# Patient Record
Sex: MALE | Race: WHITE
[De-identification: names, ages, dates, MRNs, and addresses within clinical notes are randomized per-mention and may not be internally consistent; named-entity substitution may affect disease eponyms.]

---

## 2017-05-18 ENCOUNTER — HOSPITAL ENCOUNTER (EMERGENCY)
Dept: HOSPITAL 53 - M ED | Age: 52
Discharge: HOME | End: 2017-05-18
Payer: COMMERCIAL

## 2017-05-18 VITALS — SYSTOLIC BLOOD PRESSURE: 153 MMHG | DIASTOLIC BLOOD PRESSURE: 67 MMHG

## 2017-05-18 DIAGNOSIS — Z79.899: ICD-10-CM

## 2017-05-18 DIAGNOSIS — R94.31: ICD-10-CM

## 2017-05-18 DIAGNOSIS — R00.2: Primary | ICD-10-CM

## 2017-05-18 LAB
ANION GAP SERPL CALC-SCNC: 6 MEQ/L (ref 8–16)
BASOPHILS # BLD AUTO: 0 K/MM3 (ref 0–0.2)
BASOPHILS NFR BLD AUTO: 0.4 % (ref 0–1)
BUN SERPL-MCNC: 19 MG/DL (ref 7–18)
CALCIUM SERPL-MCNC: 8.9 MG/DL (ref 8.5–10.1)
CHLORIDE SERPL-SCNC: 104 MEQ/L (ref 98–107)
CO2 SERPL-SCNC: 31 MEQ/L (ref 21–32)
CREAT SERPL-MCNC: 0.93 MG/DL (ref 0.7–1.3)
EOSINOPHIL # BLD AUTO: 0.2 K/MM3 (ref 0–0.5)
EOSINOPHIL NFR BLD AUTO: 1.9 % (ref 0–3)
ERYTHROCYTE [DISTWIDTH] IN BLOOD BY AUTOMATED COUNT: 13.1 % (ref 11.5–14.5)
GFR SERPL CREATININE-BSD FRML MDRD: > 60 ML/MIN/{1.73_M2} (ref 56–?)
GLUCOSE SERPL-MCNC: 125 MG/DL (ref 70–105)
LARGE UNSTAINED CELL #: 0.2 K/MM3 (ref 0–0.4)
LARGE UNSTAINED CELL %: 2.4 % (ref 0–4)
LYMPHOCYTES # BLD AUTO: 2.1 K/MM3 (ref 1.5–4.5)
LYMPHOCYTES NFR BLD AUTO: 23.9 % (ref 24–44)
MCH RBC QN AUTO: 29.2 PG (ref 27–33)
MCHC RBC AUTO-ENTMCNC: 33.8 G/DL (ref 32–36.5)
MCV RBC AUTO: 86.3 FL (ref 80–96)
MONOCYTES # BLD AUTO: 0.6 K/MM3 (ref 0–0.8)
MONOCYTES NFR BLD AUTO: 7.3 % (ref 0–5)
NEUTROPHILS # BLD AUTO: 5.6 K/MM3 (ref 1.8–7.7)
NEUTROPHILS NFR BLD AUTO: 64.1 % (ref 36–66)
PLATELET # BLD AUTO: 318 K/MM3 (ref 150–450)
POTASSIUM SERPL-SCNC: 3.7 MEQ/L (ref 3.5–5.1)
SODIUM SERPL-SCNC: 141 MEQ/L (ref 136–145)
WBC # BLD AUTO: 8.8 K/MM3 (ref 4–10)

## 2017-05-18 NOTE — REP
Clinical:  Chest pain .

 

Comparison: None .

 

Technique:  PA and lateral.

 

Findings:

The mediastinum and cardiac silhouette are normal.  The lung fields are clear and

without acute consolidation, effusion, or pneumothorax.  The skeletal structures

are intact and normal.

 

Impression:

1.   No acute cardiopulmonary process.

 

 

Signed by

[place] MD 05/18/2017 08:28 A

## 2017-05-18 NOTE — ECGEPIP
Stationary ECG Study

                           [facility] - ED

                                       

                                       Test Date:    2017

Pat Name:     EDER HORAN            Department:   

Patient ID:   [id number]                 Room:         -

Gender:       M                        Technician:   edvin

:          1965               Requested By: Humphrey BEAR

Order Number: [id number]     Reading MD:   France Brothers

                                 Measurements

Intervals                              Axis          

Rate:         98                       P:            54

NM:           154                      QRS:          17

QRSD:         98                       T:            13

QT:           342                                    

QTc:          438                                    

                           Interpretive Statements

SINUS RHYTHM

NSTTW ABNORMALITY

SIMILAR 16

Electronically Signed On 2017 8:20:05 EDT by France Brothers

## 2018-01-26 ENCOUNTER — HOSPITAL ENCOUNTER (EMERGENCY)
Dept: HOSPITAL 53 - M ED | Age: 53
Discharge: HOME | End: 2018-01-26
Payer: COMMERCIAL

## 2018-01-26 DIAGNOSIS — I49.3: Primary | ICD-10-CM

## 2018-01-26 DIAGNOSIS — Z87.891: ICD-10-CM

## 2018-01-26 LAB
ANION GAP: 7 MEQ/L (ref 8–16)
BASO #: 0.1 10^3/UL (ref 0–0.2)
BASO %: 0.7 % (ref 0–1)
BLOOD UREA NITROGEN: 17 MG/DL (ref 7–18)
CALCIUM LEVEL: 9.1 MG/DL (ref 8.5–10.1)
CARBON DIOXIDE LEVEL: 31 MEQ/L (ref 21–32)
CHLORIDE LEVEL: 102 MEQ/L (ref 98–107)
CK MB CFR.DF SERPL CALC: 1.57
CK SERPL-CCNC: 210 U/L (ref 39–308)
CK-MB VALUE MASS: 3.3 NG/ML (ref 0–3.6)
CREATININE FOR GFR: 1.02 MG/DL (ref 0.7–1.3)
D-DIMER QUANT: < 270 NG/ML (ref ?–500)
EOS #: 0.1 10^3/UL (ref 0–0.5)
EOSINOPHIL NFR BLD AUTO: 0.6 % (ref 0–3)
GFR SERPL CREATININE-BSD FRML MDRD: > 60 ML/MIN/{1.73_M2} (ref 56–?)
GLUCOSE, FASTING: 100 MG/DL (ref 70–100)
HEMATOCRIT: 47.6 % (ref 42–52)
HEMOGLOBIN: 16.9 G/DL (ref 14–18)
IMMATURE GRANULOCYTE #: 0.2 10^3/UL (ref 0–0)
IMMATURE GRANULOCYTE %: 1.8 % (ref 0–0)
LYMPH #: 3.6 10^3/UL (ref 1.5–4.5)
LYMPH %: 31.2 % (ref 24–44)
MAGNESIUM LEVEL: 2.2 MG/DL (ref 1.8–2.4)
MEAN CORPUSCULAR HEMOGLOBIN: 29 PG (ref 27–33)
MEAN CORPUSCULAR HGB CONC: 35.5 G/DL (ref 32–36.5)
MEAN CORPUSCULAR VOLUME: 81.8 FL (ref 80–96)
MONO #: 1.2 10^3/UL (ref 0–0.8)
MONO %: 10.6 % (ref 0–5)
NEUTROPHILS #: 6.4 10^3/UL (ref 1.8–7.7)
NEUTROPHILS %: 55.1 % (ref 36–66)
NRBC BLD AUTO-RTO: 0 % (ref 0–0)
PLATELET COUNT, AUTOMATED: 400 10^3/UL (ref 150–450)
POTASSIUM SERUM: 3.4 MEQ/L (ref 3.5–5.1)
RED BLOOD COUNT: 5.82 10^6/UL (ref 4.3–6.1)
RED CELL DISTRIBUTION WIDTH: 12.7 % (ref 11.5–14.5)
SODIUM LEVEL: 140 MEQ/L (ref 136–145)
TROPONIN I: < 0.02 NG/ML (ref ?–0.1)
WHITE BLOOD COUNT: 11.7 10^3/UL (ref 4–10)

## 2018-01-26 PROCEDURE — 93005 ELECTROCARDIOGRAM TRACING: CPT

## 2018-02-01 ENCOUNTER — HOSPITAL ENCOUNTER (EMERGENCY)
Dept: HOSPITAL 53 - M ED | Age: 53
Discharge: HOME | End: 2018-02-01
Payer: COMMERCIAL

## 2018-02-01 DIAGNOSIS — I48.91: Primary | ICD-10-CM

## 2018-02-01 DIAGNOSIS — I50.9: ICD-10-CM

## 2018-02-01 DIAGNOSIS — Z87.891: ICD-10-CM

## 2018-02-01 DIAGNOSIS — I11.0: ICD-10-CM

## 2018-02-01 LAB
ANION GAP: 8 MEQ/L (ref 8–16)
BASO #: 0.1 10^3/UL (ref 0–0.2)
BASO %: 0.5 % (ref 0–1)
BLOOD UREA NITROGEN: 20 MG/DL (ref 7–18)
CALCIUM LEVEL: 9.6 MG/DL (ref 8.5–10.1)
CARBON DIOXIDE LEVEL: 29 MEQ/L (ref 21–32)
CHLORIDE LEVEL: 102 MEQ/L (ref 98–107)
CK MB CFR.DF SERPL CALC: 2.32
CK SERPL-CCNC: 125 U/L (ref 39–308)
CK-MB VALUE MASS: 2.9 NG/ML (ref 0–3.6)
CREATININE FOR GFR: 0.97 MG/DL (ref 0.7–1.3)
EOS #: 0.3 10^3/UL (ref 0–0.5)
EOSINOPHIL NFR BLD AUTO: 2.4 % (ref 0–3)
GFR SERPL CREATININE-BSD FRML MDRD: > 60 ML/MIN/{1.73_M2} (ref 56–?)
GLUCOSE, FASTING: 112 MG/DL (ref 70–100)
HEMATOCRIT: 50 % (ref 42–52)
HEMOGLOBIN: 17.6 G/DL (ref 14–18)
IMMATURE GRANULOCYTE #: 0.1 10^3/UL (ref 0–0)
IMMATURE GRANULOCYTE %: 0.8 % (ref 0–0)
INR: 0.86
LYMPH #: 3.6 10^3/UL (ref 1.5–4.5)
LYMPH %: 30.9 % (ref 24–44)
MEAN CORPUSCULAR HEMOGLOBIN: 29.2 PG (ref 27–33)
MEAN CORPUSCULAR HGB CONC: 35.2 G/DL (ref 32–36.5)
MEAN CORPUSCULAR VOLUME: 82.9 FL (ref 80–96)
MONO #: 1.2 10^3/UL (ref 0–0.8)
MONO %: 10.1 % (ref 0–5)
NEUTROPHILS #: 6.5 10^3/UL (ref 1.8–7.7)
NEUTROPHILS %: 55.3 % (ref 36–66)
NRBC BLD AUTO-RTO: 0 % (ref 0–0)
PLATELET COUNT, AUTOMATED: 363 10^3/UL (ref 150–450)
POTASSIUM SERUM: 3.2 MEQ/L (ref 3.5–5.1)
PROTHROMBIN TIME: 11.8 SECONDS (ref 12.4–14.5)
RED BLOOD COUNT: 6.03 10^6/UL (ref 4.3–6.1)
RED CELL DISTRIBUTION WIDTH: 12.7 % (ref 11.5–14.5)
SODIUM LEVEL: 139 MEQ/L (ref 136–145)
THYROID STIMULATING HORMONE: 1.84 UIU/ML (ref 0.36–3.74)
TROPONIN I: < 0.02 NG/ML (ref ?–0.1)
WHITE BLOOD COUNT: 11.7 10^3/UL (ref 4–10)

## 2018-02-01 RX ADMIN — LORAZEPAM 1 MG: 2 INJECTION INTRAMUSCULAR; INTRAVENOUS at 21:05

## 2018-02-01 RX ADMIN — SODIUM CHLORIDE 1 MLS/HR: 9 INJECTION, SOLUTION INTRAVENOUS at 20:30

## 2018-02-01 RX ADMIN — METOPROLOL TARTRATE 1 MG: 50 TABLET, FILM COATED ORAL at 20:38

## 2018-02-01 RX ADMIN — METOPROLOL TARTRATE 1 MG: 5 INJECTION INTRAVENOUS at 21:01

## 2018-02-01 RX ADMIN — ASPIRIN 81 MG CHEWABLE TABLET 1 MG: 81 TABLET CHEWABLE at 20:38

## 2019-04-30 ENCOUNTER — HOSPITAL ENCOUNTER (EMERGENCY)
Dept: HOSPITAL 53 - M ED | Age: 54
Discharge: HOME | End: 2019-04-30
Payer: COMMERCIAL

## 2019-04-30 VITALS — DIASTOLIC BLOOD PRESSURE: 83 MMHG | SYSTOLIC BLOOD PRESSURE: 144 MMHG

## 2019-04-30 VITALS — DIASTOLIC BLOOD PRESSURE: 86 MMHG | SYSTOLIC BLOOD PRESSURE: 150 MMHG

## 2019-04-30 VITALS — WEIGHT: 245.51 LBS | HEIGHT: 72 IN | BODY MASS INDEX: 33.25 KG/M2

## 2019-04-30 DIAGNOSIS — I48.91: Primary | ICD-10-CM

## 2019-04-30 DIAGNOSIS — Z79.899: ICD-10-CM

## 2019-04-30 DIAGNOSIS — I10: ICD-10-CM

## 2019-04-30 NOTE — ECGEPIP
Stationary ECG Study

                           [facility] - ED

                                       

                                       Test Date:    2019

Pat Name:     EDER HORAN            Department:   

Patient ID:   [id number]                 Room:         -

Gender:       M                        Technician:   gt

:          1965               Requested By: MARIA ELENA MARSHALL 

Order Number: [id number]     Reading MD:   Humphrey Franco

                                 Measurements

Intervals                              Axis          

Rate:         98                       P:            44

AK:           146                      QRS:          13

QRSD:         86                       T:            16

QT:           336                                    

QTc:          429                                    

                           Interpretive Statements

SINUS RHYTHM

NSTTW ABNORMALITIES

RHYTHM/RATE CHANGE COMPARED TO 18

 

Electronically Signed On 2019 7:17:07 EDT by Humphrey Franco

## 2019-07-19 ENCOUNTER — HOSPITAL ENCOUNTER (INPATIENT)
Dept: HOSPITAL 53 - M ED | Age: 54
Discharge: HOME | DRG: 201 | End: 2019-07-19
Attending: INTERNAL MEDICINE | Admitting: INTERNAL MEDICINE
Payer: COMMERCIAL

## 2019-07-19 VITALS — BODY MASS INDEX: 33.93 KG/M2 | WEIGHT: 250.53 LBS | HEIGHT: 72 IN

## 2019-07-19 VITALS — SYSTOLIC BLOOD PRESSURE: 166 MMHG | DIASTOLIC BLOOD PRESSURE: 94 MMHG

## 2019-07-19 VITALS — DIASTOLIC BLOOD PRESSURE: 115 MMHG | SYSTOLIC BLOOD PRESSURE: 163 MMHG

## 2019-07-19 DIAGNOSIS — E66.9: ICD-10-CM

## 2019-07-19 DIAGNOSIS — I10: ICD-10-CM

## 2019-07-19 DIAGNOSIS — I48.0: Primary | ICD-10-CM

## 2019-07-19 LAB
ALBUMIN SERPL BCG-MCNC: 4.1 GM/DL (ref 3.2–5.2)
ALT SERPL W P-5'-P-CCNC: 45 U/L (ref 12–78)
APTT BLD: 30.6 SECONDS (ref 25–38.4)
BASOPHILS # BLD AUTO: 0.1 10^3/UL (ref 0–0.2)
BASOPHILS NFR BLD AUTO: 0.8 % (ref 0–1)
BILIRUB CONJ SERPL-MCNC: < 0.1 MG/DL (ref 0–0.2)
BILIRUB SERPL-MCNC: 0.3 MG/DL (ref 0.2–1)
BUN SERPL-MCNC: 16 MG/DL (ref 7–18)
CALCIUM SERPL-MCNC: 9.5 MG/DL (ref 8.5–10.1)
CHLORIDE SERPL-SCNC: 108 MEQ/L (ref 98–107)
CK MB CFR.DF SERPL CALC: 2.57
CK MB SERPL-MCNC: 4.4 NG/ML (ref ?–3.6)
CK SERPL-CCNC: 171 U/L (ref 39–308)
CO2 SERPL-SCNC: 27 MEQ/L (ref 21–32)
CREAT SERPL-MCNC: 0.98 MG/DL (ref 0.7–1.3)
EOSINOPHIL # BLD AUTO: 0.3 10^3/UL (ref 0–0.5)
EOSINOPHIL NFR BLD AUTO: 3 % (ref 0–3)
GFR SERPL CREATININE-BSD FRML MDRD: > 60 ML/MIN/{1.73_M2} (ref 56–?)
GLUCOSE SERPL-MCNC: 116 MG/DL (ref 70–100)
HCT VFR BLD AUTO: 52.2 % (ref 42–52)
HGB BLD-MCNC: 17.7 G/DL (ref 13.5–17.5)
INR PPP: 0.96
LYMPHOCYTES # BLD AUTO: 2.6 10^3/UL (ref 1.5–4.5)
LYMPHOCYTES NFR BLD AUTO: 29.6 % (ref 24–44)
MAGNESIUM SERPL-MCNC: 2.1 MG/DL (ref 1.8–2.4)
MCH RBC QN AUTO: 29.3 PG (ref 27–33)
MCHC RBC AUTO-ENTMCNC: 33.9 G/DL (ref 32–36.5)
MCV RBC AUTO: 86.4 FL (ref 80–96)
MONOCYTES # BLD AUTO: 0.8 10^3/UL (ref 0–0.8)
MONOCYTES NFR BLD AUTO: 9.3 % (ref 0–5)
NEUTROPHILS # BLD AUTO: 4.9 10^3/UL (ref 1.8–7.7)
NEUTROPHILS NFR BLD AUTO: 56.2 % (ref 36–66)
PHOSPHATE SERPL-MCNC: 3.9 MG/DL (ref 2.5–4.9)
PLATELET # BLD AUTO: 323 10^3/UL (ref 150–450)
POTASSIUM SERPL-SCNC: 4.4 MEQ/L (ref 3.5–5.1)
PROT SERPL-MCNC: 7.6 GM/DL (ref 6.4–8.2)
PROTHROMBIN TIME: 12.5 SECONDS (ref 11.8–14)
RBC # BLD AUTO: 6.04 10^6/UL (ref 4.3–6.1)
SODIUM SERPL-SCNC: 143 MEQ/L (ref 136–145)
T4 FREE SERPL-MCNC: 1.13 NG/DL (ref 0.76–1.46)
TROPONIN I SERPL-MCNC: < 0.02 NG/ML (ref ?–0.1)
TSH SERPL DL<=0.005 MIU/L-ACNC: 2.37 UIU/ML (ref 0.36–3.74)
WBC # BLD AUTO: 8.7 10^3/UL (ref 4–10)

## 2019-07-19 NOTE — HPE
DATE OF ADMISSION:  07/19/2019

 

CARDIOLOGIST: Dr. Louie

 

HISTORY OF PRESENT ILLNESS:   Mr. Batres is a 54-year-old gentleman who

presented to the emergency room after he was awoken around midnight early 
morning

of 07/19/2019 with palpitations, feeling his heart pounding out of his chest.  
He

states that he has a history of paroxysmal atrial fibrillation and can tell when

he is in and out of atrial fibrillation.  In the past, he has had elevated heart

rate, for which he has required emergency room visits.  After medication

administration, he improved and sent home from the emergency room in the past.

However, during this visit, his palpitations and elevated heart rate continued

despite metoprolol succinate 50 mg by mouth, amiodarone  mg times two

doses, diltiazem 10 mg IV with his heart rate sustaining in the 110s to 140s.  
He

denies any chest pain, nausea, vomiting, lightheadedness, dizziness.  He only

reports palpitations.  No recent fevers, chills, illnesses, travel history or

medication changes or sick contacts.  He is otherwise feeling well and has no

other complaints.

 

PAST MEDICAL HISTORY:

1.  Paroxysmal atrial fibrillation.

2.  Hypertension.

3.  Obesity.

 

FAMILY HISTORY:   The patient reports parents are healthy otherwise without any

cardiac issues.

 

ALLERGIES:  None.

 

PAST SURGICAL HISTORY:

1.  Appendectomy.

 

HOME MEDICATIONS:

- aspirin 81 mg by mouth every 2 days

- magnesium oxide 800 mg by mouth daily

- metoprolol succinate 50 mg by mouth daily

 

REVIEW OF SYSTEMS:  10 point review of systems negative besides that noted in 
the

history of present illness.

 

PHYSICAL EXAMINATION:

VITAL SIGNS:  Temperature 96.2, pulse 136, respirations 20, blood pressure

160/115, pulse oximetry 94% on room air.

GENERAL:  Resting comfortably in bed in no acute distress.  Alert and oriented

times three.  Pleasant gentleman who appears his stated age.

HEENT:  Normocephalic, atraumatic.  Moist mucous membranes.  No jugular venous

distention (JVD) or carotid bruits.

NECK:  Supple.

CARDIAC:  Irregular rhythm at a tachycardic rate.  No appreciable murmur, clicks

or gallops.  Minimal peripheral edema.

LUNGS:  Clear to auscultation bilaterally without any adventitious sounds.

Equal chest rise bilaterally.

ABDOMEN:  Obese, soft, nontender, nondistended.  Normoactive bowel sounds.

EXTREMITIES:  2+ radial pulses bilaterally.  Able to move all extremities

independently.

MUSCULOSKELETAL:  Strength is intact.  Ambulates independently.

NEUROLOGIC:  No focal deficits.

SKIN:  No visible lesions or ulcerations.

 

LABORATORIES:

WBC 8.7, hemoglobin and hematocrit 7.7 and 52.2, platelets 323.  Sodium and

potassium 140 and 4.4, BUN and creatinine 16 and 0.98.  Magnesium and 
phosphorous

2.1 and 3.9.  Liver profile normal.  Cardiac markers negative.  TSH is normal at

2.37.

 

IMAGING:  Chest x-ray on 07/19/2019 reports negative chest x-ray.  This was

personally viewed and interpreted by me.

 

ASSESSMENT AND PLAN:

1.  Paroxysmal atrial fibrillation with rapid ventricular response.  The patient

is asymptomatic. Although his heart rate continues to stay in the 120s to 140s

despite medication administration in the emergency room.  Our options include

admitting the patient to intensive care unit (ICU) on a Cardizem drip, however,

we are told that there are no ICU beds available.  We have reached out to

cardiologist on call, who recommended doubling his metoprolol succinate to 50 mg
by

mouth twice a day instead of once a day and then also giving an additional dose

of diltiazem 10 mg IV currently .  We will admit him to the progressive care 
unit

(PCU) and monitor on telemetry.  The cause of his rapid ventricular response is

unclear at this point given that there is no infectious etiology and thyroid and

electrolytes are within normal limits.  The patient reports that he has been

fully compliant with his medications.  Continue his workup and we will obtain an

echocardiogram as well.  Cardiology was consulted and appreciate their input.

His [id number] score is 1, positive for HTN, and thus AC is excluded.

 

2.  Hypertension.  Continue on metoprolol double dose, as stated above.

 

3.  Obesity.  Complicates care.

 

4.  Deep vein thrombosis (DVT) prophylaxis.  Lovenox subcutaneously.

 

DISPOSITION:  We will admit to the hospitalist service and monitor on telemetry.

Waiting to be seen by cardiology.





Attending Addendum:

I performed a history and physical examination and discussed the management with
the resident. I reviewed the resident's note and agree with the documented 
findings and plan of care documented above. 

PHILIPP

## 2019-07-19 NOTE — ECGEPIP
[facility] - ED

                                       

                                       Test Date:    2019

Pat Name:     EDER HORAN            Department:   

Patient ID:   [id number]                 Room:         -

Gender:       Male                     Technician:   KWADWO

:          1965               Requested By: LALO AIKEN

Order Number: [id number]     Reading MD:   Humphrey Franco

                                 Measurements

Intervals                              Axis          

Rate:         136                      P:            

ME:           -1                       QRS:          20

QRSD:         92                       T:            

QT:           291                                    

QTc:          438                                    

                           Interpretive Statements

ATRIAL FIBRILLATION WITH RAPID VENTRICULAR RESPONSE

MODERATE ST DEPRESSION

RHYTHM/RATE CHANGE COMPARED TO 19

Electronically Signed on 2019 6:01:50 EDT by Humphrey Franco

## 2019-07-19 NOTE — DSES
DATE OF ADMISSION:  07/19/2019

DATE OF DISCHARGE:  07/19/2019

 

Patient has been admitted today for atrial fibrillation with rapid ventricular

response (RVR); however, he is demanding to leave against medical advice as he

has just found out that his sister, unfortunately, has passed away. It has been

discussed with him that the risks of leaving include, worsening of his

arrhythmia, heart attack, and even death. He has relayed understanding of this

and, nonetheless, still wants to leave. Fortunately, the second dose of 10 mg

intravenous (IV) Cardizem has successfully brought his heart rate down into the

80s and he is, otherwise, stable. He has been strongly advised to continue

staying in the hospital for continued treatment and monitoring; however, he is

adamant about leaving.

 

We have explained to him that we have sent in scripts for his adjusted dose of

metoprolol succinate from 50 once a day to now bid as per Cardiology 
recommendations to his pharmacy and that he should closely followup

outpatient with his cardiologist, which is normally Dr. Louie, but currently 
Dr. Gonzalez is covering for him. Patient was advised to return to the emergency room 
(ER)

immediately for further treatment.



Attending Note: I have personally seen and examined the patient this am. I agree
with the finding and the plan of care as documented above in the resident's 
note. I counselled the patient against leaving but he has to leave even with 
risks because of family emergency. 

PHILIPP

## 2019-07-19 NOTE — REP
Chest x-ray:  Two views.

 

History: Syncope versus seizure .

 

Comparison study: No comparison study .

 

Findings:  The lungs are well inflated and free of infiltrate.  The pleural

angles are sharp.  The heart size is normal.  Pulmonary vasculature is not

increased.  No significant bony abnormality is seen. EKG monitoring electrodes

are visible.

 

Impression:

 

Negative chest x-ray.

 

 

Electronically Signed by

Regulo Pierce MD 07/19/2019 08:07 A

## 2019-08-19 ENCOUNTER — HOSPITAL ENCOUNTER (EMERGENCY)
Dept: HOSPITAL 53 - M ED | Age: 54
Discharge: HOME | End: 2019-08-19
Payer: COMMERCIAL

## 2019-08-19 VITALS — BODY MASS INDEX: 33.93 KG/M2 | WEIGHT: 250.53 LBS | HEIGHT: 72 IN

## 2019-08-19 VITALS — DIASTOLIC BLOOD PRESSURE: 71 MMHG | SYSTOLIC BLOOD PRESSURE: 134 MMHG

## 2019-08-19 DIAGNOSIS — I49.1: Primary | ICD-10-CM

## 2019-08-19 DIAGNOSIS — E66.9: ICD-10-CM

## 2019-08-19 DIAGNOSIS — Z79.899: ICD-10-CM

## 2019-08-19 DIAGNOSIS — Z79.82: ICD-10-CM

## 2019-08-19 DIAGNOSIS — I10: ICD-10-CM

## 2019-08-19 LAB
ALBUMIN SERPL BCG-MCNC: 4.2 GM/DL (ref 3.2–5.2)
ALT SERPL W P-5'-P-CCNC: 39 U/L (ref 12–78)
BASOPHILS # BLD AUTO: 0 10^3/UL (ref 0–0.2)
BASOPHILS NFR BLD AUTO: 0.3 % (ref 0–1)
BILIRUB SERPL-MCNC: 0.4 MG/DL (ref 0.2–1)
BUN SERPL-MCNC: 14 MG/DL (ref 7–18)
CALCIUM SERPL-MCNC: 9.3 MG/DL (ref 8.5–10.1)
CHLORIDE SERPL-SCNC: 107 MEQ/L (ref 98–107)
CK MB CFR.DF SERPL CALC: 2.89
CK MB SERPL-MCNC: 4.3 NG/ML (ref ?–3.6)
CK SERPL-CCNC: 149 U/L (ref 39–308)
CO2 SERPL-SCNC: 28 MEQ/L (ref 21–32)
CREAT SERPL-MCNC: 0.89 MG/DL (ref 0.7–1.3)
EOSINOPHIL # BLD AUTO: 0.1 10^3/UL (ref 0–0.5)
EOSINOPHIL NFR BLD AUTO: 0.7 % (ref 0–3)
GFR SERPL CREATININE-BSD FRML MDRD: > 60 ML/MIN/{1.73_M2} (ref 56–?)
GLUCOSE SERPL-MCNC: 110 MG/DL (ref 70–100)
HCT VFR BLD AUTO: 48 % (ref 42–52)
HGB BLD-MCNC: 16.4 G/DL (ref 13.5–17.5)
INR PPP: 0.98
LIPASE SERPL-CCNC: 71 U/L (ref 73–393)
LYMPHOCYTES # BLD AUTO: 1.9 10^3/UL (ref 1.5–4.5)
LYMPHOCYTES NFR BLD AUTO: 15.5 % (ref 24–44)
MAGNESIUM SERPL-MCNC: 2.1 MG/DL (ref 1.8–2.4)
MCH RBC QN AUTO: 28.8 PG (ref 27–33)
MCHC RBC AUTO-ENTMCNC: 34.2 G/DL (ref 32–36.5)
MCV RBC AUTO: 84.4 FL (ref 80–96)
MONOCYTES # BLD AUTO: 0.8 10^3/UL (ref 0–0.8)
MONOCYTES NFR BLD AUTO: 6.4 % (ref 0–5)
NEUTROPHILS # BLD AUTO: 9.4 10^3/UL (ref 1.8–7.7)
NEUTROPHILS NFR BLD AUTO: 76.1 % (ref 36–66)
PLATELET # BLD AUTO: 310 10^3/UL (ref 150–450)
POTASSIUM SERPL-SCNC: 3.9 MEQ/L (ref 3.5–5.1)
PROT SERPL-MCNC: 7.3 GM/DL (ref 6.4–8.2)
PROTHROMBIN TIME: 12.7 SECONDS (ref 11.8–14)
RBC # BLD AUTO: 5.69 10^6/UL (ref 4.3–6.1)
SODIUM SERPL-SCNC: 139 MEQ/L (ref 136–145)
TROPONIN I SERPL-MCNC: < 0.02 NG/ML (ref ?–0.1)
TSH SERPL DL<=0.005 MIU/L-ACNC: 1.72 UIU/ML (ref 0.36–3.74)
WBC # BLD AUTO: 12.4 10^3/UL (ref 4–10)

## 2019-08-20 NOTE — ECGEPIP
[facility] - ED

                                       

                                       Test Date:    2019

Pat Name:     EDER HORAN            Department:   

Patient ID:   [id number]                 Room:         -

Gender:       Male                     Technician:   marielle

:          1965               Requested By: Humphrey BEAR

Order Number: [id number]     Reading MD:   France Brothers

                                 Measurements

Intervals                              Axis          

Rate:         73                       P:            47

SC:           161                      QRS:          17

QRSD:         101                      T:            16

QT:           393                                    

QTc:          435                                    

                           Interpretive Statements

SINUS RHYTHM WITH OCCASIONAL SUPRAVENTRICULAR PREMATURE COMPLEXES

NSTTW abnormalities

19 ATRIAL FIBRILLATION

Electronically Signed on 2019 10:33:52 EDT by France Brothers

## 2019-11-19 ENCOUNTER — HOSPITAL ENCOUNTER (EMERGENCY)
Dept: HOSPITAL 53 - M ED | Age: 54
Discharge: HOME | End: 2019-11-19
Payer: COMMERCIAL

## 2019-11-19 VITALS — SYSTOLIC BLOOD PRESSURE: 180 MMHG | DIASTOLIC BLOOD PRESSURE: 89 MMHG

## 2019-11-19 VITALS — BODY MASS INDEX: 30.51 KG/M2 | WEIGHT: 250.53 LBS | HEIGHT: 76 IN

## 2019-11-19 DIAGNOSIS — I48.91: ICD-10-CM

## 2019-11-19 DIAGNOSIS — K59.00: ICD-10-CM

## 2019-11-19 DIAGNOSIS — I10: ICD-10-CM

## 2019-11-19 DIAGNOSIS — R51: ICD-10-CM

## 2019-11-19 DIAGNOSIS — Z90.49: ICD-10-CM

## 2019-11-19 DIAGNOSIS — Z79.82: ICD-10-CM

## 2019-11-19 DIAGNOSIS — K85.90: Primary | ICD-10-CM

## 2019-11-19 LAB
ALBUMIN SERPL BCG-MCNC: 4.1 GM/DL (ref 3.2–5.2)
ALT SERPL W P-5'-P-CCNC: 44 U/L (ref 12–78)
BASOPHILS # BLD AUTO: 0.1 10^3/UL (ref 0–0.2)
BASOPHILS NFR BLD AUTO: 0.5 % (ref 0–1)
BILIRUB CONJ SERPL-MCNC: 0.2 MG/DL (ref 0–0.2)
BILIRUB SERPL-MCNC: 0.8 MG/DL (ref 0.2–1)
BUN SERPL-MCNC: 13 MG/DL (ref 7–18)
CALCIUM SERPL-MCNC: 9.4 MG/DL (ref 8.5–10.1)
CHLORIDE SERPL-SCNC: 108 MEQ/L (ref 98–107)
CK MB CFR.DF SERPL CALC: 1.33
CK MB SERPL-MCNC: 3.2 NG/ML (ref ?–3.6)
CK SERPL-CCNC: 240 U/L (ref 39–308)
CO2 SERPL-SCNC: 24 MEQ/L (ref 21–32)
CREAT SERPL-MCNC: 0.88 MG/DL (ref 0.7–1.3)
EOSINOPHIL # BLD AUTO: 0 10^3/UL (ref 0–0.5)
EOSINOPHIL NFR BLD AUTO: 0.2 % (ref 0–3)
GFR SERPL CREATININE-BSD FRML MDRD: > 60 ML/MIN/{1.73_M2} (ref 56–?)
GLUCOSE SERPL-MCNC: 101 MG/DL (ref 70–100)
HCT VFR BLD AUTO: 51.1 % (ref 42–52)
HGB BLD-MCNC: 17.1 G/DL (ref 13.5–17.5)
LIPASE SERPL-CCNC: 165 U/L (ref 73–393)
LYMPHOCYTES # BLD AUTO: 1.6 10^3/UL (ref 1.5–5)
LYMPHOCYTES NFR BLD AUTO: 13.5 % (ref 24–44)
MCH RBC QN AUTO: 28.8 PG (ref 27–33)
MCHC RBC AUTO-ENTMCNC: 33.5 G/DL (ref 32–36.5)
MCV RBC AUTO: 86.2 FL (ref 80–96)
MONOCYTES # BLD AUTO: 0.8 10^3/UL (ref 0–0.8)
MONOCYTES NFR BLD AUTO: 6.8 % (ref 0–5)
NEUTROPHILS # BLD AUTO: 9.1 10^3/UL (ref 1.5–8.5)
NEUTROPHILS NFR BLD AUTO: 78.1 % (ref 36–66)
PLATELET # BLD AUTO: 330 10^3/UL (ref 150–450)
POTASSIUM SERPL-SCNC: 4.6 MEQ/L (ref 3.5–5.1)
PROT SERPL-MCNC: 7.6 GM/DL (ref 6.4–8.2)
RBC # BLD AUTO: 5.93 10^6/UL (ref 4.3–6.1)
SODIUM SERPL-SCNC: 140 MEQ/L (ref 136–145)
TROPONIN I SERPL-MCNC: < 0.02 NG/ML (ref ?–0.1)
WBC # BLD AUTO: 11.7 10^3/UL (ref 4–10)

## 2019-11-19 PROCEDURE — 83690 ASSAY OF LIPASE: CPT

## 2019-11-19 PROCEDURE — 82550 ASSAY OF CK (CPK): CPT

## 2019-11-19 PROCEDURE — 99284 EMERGENCY DEPT VISIT MOD MDM: CPT

## 2019-11-19 PROCEDURE — 96361 HYDRATE IV INFUSION ADD-ON: CPT

## 2019-11-19 PROCEDURE — 81001 URINALYSIS AUTO W/SCOPE: CPT

## 2019-11-19 PROCEDURE — 80076 HEPATIC FUNCTION PANEL: CPT

## 2019-11-19 PROCEDURE — 85025 COMPLETE CBC W/AUTO DIFF WBC: CPT

## 2019-11-19 PROCEDURE — 80048 BASIC METABOLIC PNL TOTAL CA: CPT

## 2019-11-19 PROCEDURE — 74177 CT ABD & PELVIS W/CONTRAST: CPT

## 2019-11-19 PROCEDURE — 84484 ASSAY OF TROPONIN QUANT: CPT

## 2019-11-19 PROCEDURE — 96375 TX/PRO/DX INJ NEW DRUG ADDON: CPT

## 2019-11-19 PROCEDURE — 82553 CREATINE MB FRACTION: CPT

## 2019-11-19 PROCEDURE — 93005 ELECTROCARDIOGRAM TRACING: CPT

## 2019-11-19 PROCEDURE — 96374 THER/PROPH/DIAG INJ IV PUSH: CPT

## 2019-11-19 NOTE — REP
CT ABDOMEN AND PELVIS WITH IV CONTRAST:

 

TECHNIQUE:  Axial contrast enhanced images from the lung bases to the pubic

symphysis using 100 mL Isovue 370 intravenous contrast material with multiplanar

reformations.

 

 

 

Visualized lung bases are clear. The liver demonstrates no mass. Patient has had

a prior cholecystectomy. There is no biliary dilatation. Spleen is unremarkable.

Left adrenal nodule measures 1.8 cm which may represent an adenoma. In the

superior body of the pancreas there is a focal 1 cm hypodense area with apparent

adjacent induration of fat. This may represent pancreatitis. However, I would

recommend followup to rule out underlying small cystic mass. Kidneys are

unremarkable with no hydronephrosis. There is no abdominal aortic aneurysm with

mild scattered atherosclerotic calcification. There is no adenopathy. There is no

free air or free fluid. No bowel wall thickening is seen. The appendix is normal.

There is small bilateral inguinal hernia containing fat. Urinary bladder is not

distended and not evaluated. Left rectus muscle lipoma is noted.

 

IMPRESSION:

 

1 cm hypodense area in the superior body of the pancreas with what appears to be

mild adjacent induration of fat. This may indicate pancreatitis. Recommend

followup to rule out underlying cystic mass.

 

Status post cholecystectomy without biliary dilatation.

 

Small bilateral inguinal hernias containing fat.

 

1.8 cm nodule left adrenal gland may represent an adenoma. Followup recommended.

 

 

 

Electronically Signed by

Kenneth Ford MD 11/20/2019 03:42 P

## 2019-11-19 NOTE — ECGEPIP
[facility] - ED

                                       

                                       Test Date:    2019

Pat Name:     EDER HORAN            Department:   

Patient ID:   [id number]                 Room:         -

Gender:       Male                     Technician:   tyrese

:          1965               Requested By: BENI DRIVER PA-C

Order Number: [id number]     Reading MD:   Humphrey Franco

                                 Measurements

Intervals                              Axis          

Rate:         73                       P:            45

ID:           [id number]                      QRS:          16

QRSD:         98                       T:            13

QT:           391                                    

QTc:          431                                    

                           Interpretive Statements

SINUS RHYTHM WITH SINUS ARRHYTHMIA

NSTTW ABNORMALITIES

SIMILAR TO 19

Electronically Signed on 2019 23:51:44 EST by Humphrey Franco

## 2019-11-22 NOTE — ED PDOC
Post-Departure Follow-Up


aurora ortega faxed formal report of ct abdp for fu mlg Lundborg-Gray,Maja MD          Nov 22, 2019 10:32

## 2021-04-10 ENCOUNTER — HOSPITAL ENCOUNTER (OUTPATIENT)
Dept: HOSPITAL 53 - M ED | Age: 56
LOS: 1 days | Discharge: HOME | End: 2021-04-11
Attending: STUDENT IN AN ORGANIZED HEALTH CARE EDUCATION/TRAINING PROGRAM
Payer: COMMERCIAL

## 2021-04-10 VITALS — BODY MASS INDEX: 33.83 KG/M2 | HEIGHT: 72 IN | WEIGHT: 249.78 LBS

## 2021-04-10 DIAGNOSIS — Z79.899: ICD-10-CM
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
## 2021-04-10 DIAGNOSIS — S61.210A: ICD-10-CM

## 2021-04-10 DIAGNOSIS — I10: ICD-10-CM

## 2021-04-10 DIAGNOSIS — S62.636B: Primary | ICD-10-CM

## 2021-04-10 DIAGNOSIS — Z79.82: ICD-10-CM
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
## 2021-04-10 DIAGNOSIS — S61.214A: ICD-10-CM

## 2021-04-10 DIAGNOSIS — S61.212A: ICD-10-CM

## 2021-04-10 DIAGNOSIS — I48.91: ICD-10-CM
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
## 2021-04-10 LAB
HCT VFR BLD AUTO: 49.8 % (ref 42–52)
HGB BLD-MCNC: 16.6 G/DL (ref 13.5–17.5)
MCH RBC QN AUTO: 28.3 PG (ref 27–33)
MCHC RBC AUTO-ENTMCNC: 33.3 G/DL (ref 32–36.5)
MCV RBC AUTO: 85 FL (ref 80–96)
PLATELET # BLD AUTO: 348 10^3/UL (ref 150–450)
RBC # BLD AUTO: 5.86 10^6/UL (ref 4.3–6.1)
WBC # BLD AUTO: 12.7 10^3/UL (ref 4–10)

## 2021-04-10 PROCEDURE — 96361 HYDRATE IV INFUSION ADD-ON: CPT

## 2021-04-10 PROCEDURE — 90471 IMMUNIZATION ADMIN: CPT

## 2021-04-10 PROCEDURE — 26951 AMPUTATION OF FINGER/THUMB: CPT

## 2021-04-10 PROCEDURE — 96375 TX/PRO/DX INJ NEW DRUG ADDON: CPT

## 2021-04-10 PROCEDURE — 99284 EMERGENCY DEPT VISIT MOD MDM: CPT

## 2021-04-10 PROCEDURE — 96372 THER/PROPH/DIAG INJ SC/IM: CPT

## 2021-04-10 PROCEDURE — 73130 X-RAY EXAM OF HAND: CPT

## 2021-04-10 PROCEDURE — 85027 COMPLETE CBC AUTOMATED: CPT

## 2021-04-10 PROCEDURE — 12031 INTMD RPR S/A/T/EXT 2.5 CM/<: CPT

## 2021-04-10 PROCEDURE — 90715 TDAP VACCINE 7 YRS/> IM: CPT

## 2021-04-10 PROCEDURE — 96365 THER/PROPH/DIAG IV INF INIT: CPT

## 2021-04-10 PROCEDURE — 12001 RPR S/N/AX/GEN/TRNK 2.5CM/<: CPT

## 2021-04-10 NOTE — REP
INDICATION:

trauma, saw blade mult. lacs of fingers



COMPARISON:

None.



TECHNIQUE:

Four views right hand.



FINDINGS:

There is a comminuted fracture of the 5th distal phalanx.  There is no other evidence

of fracture or dislocation.  Deep soft tissue disruption is noted of the 3rd through

5th digits.There is spurring of the 2nd and 3rd distal phalanges and head of 3rd

metacarpal.



IMPRESSION:

Comminuted fracture 5th distal phalanx.





<Electronically signed by Kenneth Ford > 04/10/21 [id number]

## 2021-04-11 VITALS — DIASTOLIC BLOOD PRESSURE: 85 MMHG | SYSTOLIC BLOOD PRESSURE: 153 MMHG

## 2021-04-11 RX ADMIN — FENTANYL CITRATE PRN MCG: 50 INJECTION, SOLUTION INTRAMUSCULAR; INTRAVENOUS at 00:26

## 2021-04-11 RX ADMIN — FENTANYL CITRATE PRN MCG: 50 INJECTION, SOLUTION INTRAMUSCULAR; INTRAVENOUS at 00:38

## 2021-04-11 RX ADMIN — FENTANYL CITRATE PRN MCG: 50 INJECTION, SOLUTION INTRAMUSCULAR; INTRAVENOUS at 00:32

## 2021-04-11 RX ADMIN — FENTANYL CITRATE PRN MCG: 50 INJECTION, SOLUTION INTRAMUSCULAR; INTRAVENOUS at 00:21

## 2021-04-11 NOTE — RO
OPERATIVE NOTE



DATE OF OPERATION: 04/10/2021



PREOPERATIVE DIAGNOSES:

1.  Right hand laceration by saw to the right index finger, middle finger, ring

    finger, small finger zone 2 involving the middle finger flexor digitorum

    profundus.

2.  30% laceration of middle finger flexor digitorum profundus zone 2.

3.  Right small finger distal phalanx fracture.

4.  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



POSTOPERATIVE DIAGNOSES:

1.  Right hand laceration by saw to the right index finger, middle finger, ring

    finger, small finger zone 2 involving the middle finger flexor digitorum

    profundus.

2.  30% laceration of middle finger flexor digitorum profundus zone 2.

3.  Right small finger distal phalanx fracture.

4.  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



PROCEDURES:

1.  Right hand irrigation and debridement involving index finger, middle

    finger, ring finger and small finger.

2.  Exploration of the right hand.

3.  Revision amputation to the right small finger distal phalanx.



SURGEON: Jamie Rossi MD



ASSISTANT: None.



SUPERVISING ATTENDING: Jamie Rossi MD



ANESTHESIA: GETA



FINDINGS: The patient had laceration to the right hand index finger, middle

finger, ring finger and small finger to include skin as well as 30% of the FDP

of the right middle finger in zone 2. The patient also had open tuft fracture

of the right small finger distal phalanx.



INDICATIONS: The patient is a 55-year-old male who sustained a saw injury to

his right hand while doing woodworking at home on 10th of April, 2021. The

patient presented to [facility] where he was evaluated for right

hand injuries as described above. Orthopedic surgery was consulted for the

aforementioned injuries. The patient was indicated for right hand irrigation

and debridement, exploration of the flexor tendon of right hand as well as

revision amputation of the right small finger distal phalanx.



TOURNIQUET TIME: 80 minutes.



ESTIMATED BLOOD LOSS: 20 mL.



IV FLUIDS: Please see anesthesia report.



IV ANTIBIOTICS: 2 gm Ancef.



IMPLANTS: None.



SPECIMEN: None.



CULTURES: None.



DESCRIPTION OF PROCEDURE: The patient was met in the preoperative holding area

where the patient's operative extremity, side and patient identity were

confirmed to be correct. The patient was then transported to the operating

theater where he was placed supine on regular surgical bed. The patient's right

upper extremity was placed on a hand table. Safety straps secured the patient

to the bed. All bony prominences were well padded. The bilateral lower

extremities had SCDs in place. A timeout was called to confirm correct patient,

correct operative extremity and correct consent, and all staff was in

agreement. The patient was then draped in the usual sterile fashion. 



We began the procedure by copiously irrigating all the laceration sites to

include the right hand index finger, middle finger, ring finger and small

finger using 6 liters of normal saline. At this point in time after copious

irrigation I performed a loose approximation of the laceration involving the

volar aspect of the ring finger distal phalanx using one nylon stitch. I then

turned my attention to the right middle finger volar laceration in zone 2

overlying the middle phalanx. I was able to appreciate a 30% tear within the

flexor digitorum profundus on the ulnar aspect. However, the rest of the tendon

was in continuity and good condition. I debrided the edges of the tear and the

patient was taken through full range of motion without any triggering. I then

loosely approximated the dermis and epidermis of the skin using interrupted

nylon simple suture. I then turned my attention to the right ring finger. There

were no flexor digitorum profundus or flexor digitorum superficialis flexor

tendon involvement. The necrotic looking skin edges were debrided and this was

loosely approximated with nylon suture. Attention was then turned to the

patient's right small finger where he had an open tuft fracture. I used a

rongeur to debride out comminuted distal phalanx fracture. The patient had

approximately 75% of the residual distal phalanx. At this point in time I then

smoothed the edges of the remainder of the distal phalanx using rasp. I removed

the patient's fingernail and was able to cover the defect using the patient's

small finger pad which was then approximated with chromic gut suture. I was

able to close the nailbed laceration overlying the previously fractured distal

phalanx. Using chromic gut suture I placed aluminum metallic splint within the

eponychial fold in order to ensure that this remained open and able to provide

space for the restoration of the nail plate. This was then secured using

chromic gut. At this point we were complete with the procedure. We then placed

Xeroform over the patient's surgical wounds followed by Heavenly. The

patient's right upper extremity was placed in volar slab splint for soft tissue

rest in the intrinsic plus position. The patient was extubated and transferred

to the postanesthesia care unit.



The patient will remain in the sling for five days until the patient presents

to [facility] for wound check. He will follow up with Dr. Diez at [facility] for wound check and initiation of

occupational therapy. Occupational therapy will follow the FDP or flexor

digitorum profundus laceration rehab protocol. However, given the fact that he

had 75% of the tendon intact will encourage active and passive range of motion

from his occupational therapy rehab protocol. The patient was prescribed

Percocet, Zofran and Colace which was picked up by his wife at [facility]. The patient's wife was notified of the proceedings.

## 2021-04-11 NOTE — ER
ER  CONSULTATION



DATE: 04/10/2021



TIME:  6 p.m.



CONSULTED SERVICE:  Orthopaedic surgery.



CONSULTED PHYSICIAN:  Jamie Rossi MD



HISTORY OF PRESENT ILLNESS:  This is a 55-year-old male who sustained a right

hand laceration by table saw.  He had a right hand palmar laceration involving

the volar aspect of the patient's right index finger, middle finger, ring

finger, and small finger.  This was a zone 2 injury which involved the middle

finger flexor digitorum profundus.  The patient also had an open tuft fracture

of the right small finger distal phalanx.  Orthopaedic surgery was consulted

for the aforementioned injury and the patient is indicated for a right hand

irrigation and debridement and exploration of the traumatic laceration

involving the aforementioned finger.  The patient was also indicated for a

right small finger revision amputation.



PAST MEDICAL HISTORY:  Includes hypertension and atrial fibrillation.



PAST SURGICAL HISTORY:  Cholecystectomy 2016, hernia several decades prior.



ALLERGIES:  Seasonal.



CURRENT MEDICATIONS:  Include:

- metoprolol 50 mg 

- aspirin 81 mg 



Patient is a nondrinker, nonsmoker, non-IV drug user.



REVIEW OF SYSTEMS:  14-point review of systems was negative unless as otherwise

described in the history of present illness (HPI) above.



PHYSICAL EXAMINATION:  Patient is alert to person, time, and place.  Physical

exam of the right hand:  Patient has obvious laceration involving zone 2 of the

right hand to include the index finger, middle finger, ring finger, and small

finger.  With minimal exploration I can appreciate the patient's middle finger

flexor digitorum profundus flexor tendon in zone 2.  However, the patient was

able to make a full fist with 5/5 strength.  The patient did also have a large

laceration involving the small finger distal phalanx which appeared to be an

open tuft fracture.  The patient displayed 5/5 motor strength to the right hand

without any rotational deformity.  The patient did have an obvious distal

phalanx fracture of the small finger and did have some sensation loss to the

ulnar aspect of the patient's ring finger distal to the laceration.  It

appeared that the digital nerve on the ring finger ulnar aspect was likely

compromised during the initial injury.  The patient otherwise reported

sensation intact to the pulps of each of his fingers of the right hand.  The

patient's right upper extremity was otherwise intact and this was an isolated

injury.



Radiographs of the patient's right hand demonstrates distal phalanx fracture of

the small finger of the right hand, the distal most 20% of the small finger

distal phalanx.  There are no other osseous abnormalities noted in the soft

tissue disruption involving the aforementioned fingers.



IMPRESSION:  This is a 55-year-old male with right hand injury as described

above requiring right hand irrigation and debridement, exploration of the

injury, and small finger revision amputation.



PLAN:  The patient was given 2 grams of Ancef in the emergency room (ER) and

his tetanus was updated appropriately.  He was given the appropriate pain

medication by the emergency room (ER) provider.  The patient was irrigated with

3 liters of normal saline by the emergency room (ER) provider.  The patient was

placed in a dressing.  On my examination, he was an appropriate candidate for

right hand irrigation and debridement given the extensive soft tissue

disruption as well as exploration of the patient's middle finger flexor

digitorum profundus (FDP) tendon.  The patient also requires a right small

finger revision amputation given the open nature of his tuft fracture involving

the distal phalanx.  He was consented for such.  The patient was made nothing

by mouth and his COVID test was negative.

## 2021-04-11 NOTE — REP
INDICATION:

SURGERY



COMPARISON:

04/10/2021 5:27 p.m.



TECHNIQUE:

Multiple C-arm views right 5th digit.



FINDINGS:

Soft tissue disruption of distal 5th digit again noted with fracture of distal phalanx.



IMPRESSION:

14 seconds of fluoroscopy time is utilized.





<Electronically signed by Kenneth Ford > 04/11/21 [id number]

## 2021-12-21 NOTE — REP
Portable chest, 10:33 p.m., single AP view with the patient upright:

Comparison is 07/19/2019.

The lung fields are clear.

The cardiac size is normal.

The marine, mediastinum, and skeletal structures are unremarkable.

Impression:

Negative portable chest.

There is no interval change.

 

 

Electronically Signed by

Kenneth Steele MD 08/20/2019 07:29 A
no

## 2024-02-23 ENCOUNTER — HOSPITAL ENCOUNTER (EMERGENCY)
Dept: HOSPITAL 53 - M ED | Age: 59
Discharge: HOME | End: 2024-02-23
Payer: COMMERCIAL

## 2024-02-23 VITALS — WEIGHT: 251.11 LBS | BODY MASS INDEX: 34.01 KG/M2 | HEIGHT: 72 IN

## 2024-02-23 VITALS — OXYGEN SATURATION: 99 % | TEMPERATURE: 98.4 F

## 2024-02-23 VITALS — SYSTOLIC BLOOD PRESSURE: 133 MMHG | DIASTOLIC BLOOD PRESSURE: 67 MMHG

## 2024-02-23 DIAGNOSIS — I49.1: ICD-10-CM

## 2024-02-23 DIAGNOSIS — Z87.891: ICD-10-CM

## 2024-02-23 DIAGNOSIS — Z86.79: ICD-10-CM

## 2024-02-23 DIAGNOSIS — R00.2: Primary | ICD-10-CM

## 2024-02-23 DIAGNOSIS — Z79.82: ICD-10-CM

## 2024-02-23 DIAGNOSIS — E86.0: ICD-10-CM

## 2024-02-23 DIAGNOSIS — Z79.899: ICD-10-CM

## 2024-02-23 LAB
BASOPHILS # BLD AUTO: 0 10^3/UL (ref 0–0.2)
BASOPHILS NFR BLD AUTO: 0.4 % (ref 0–1)
BUN SERPL-MCNC: 19 MG/DL (ref 9–23)
CALCIUM SERPL-MCNC: 9 MG/DL (ref 8.5–10.1)
CHLORIDE SERPL-SCNC: 105 MMOL/L (ref 98–107)
CK MB CFR.DF SERPL CALC: 2.93
CK MB SERPL-MCNC: 3.4 NG/ML (ref ?–3.6)
CK SERPL-CCNC: 116 U/L (ref 46–171)
CO2 SERPL-SCNC: 24 MMOL/L (ref 20–31)
CREAT SERPL-MCNC: 0.79 MG/DL (ref 0.7–1.3)
EOSINOPHIL # BLD AUTO: 0.2 10^3/UL (ref 0–0.5)
EOSINOPHIL NFR BLD AUTO: 3 % (ref 0–3)
GFR SERPL CREATININE-BSD FRML MDRD: > 60 ML/MIN/{1.73_M2} (ref 56–?)
GLUCOSE SERPL-MCNC: 109 MG/DL (ref 60–100)
HCT VFR BLD AUTO: 47.7 % (ref 42–52)
HGB BLD-MCNC: 16.4 G/DL (ref 13.5–17.5)
LYMPHOCYTES # BLD AUTO: 2.1 10^3/UL (ref 1.5–5)
LYMPHOCYTES NFR BLD AUTO: 26.5 % (ref 24–44)
MAGNESIUM SERPL-MCNC: 2 MG/DL (ref 1.8–2.4)
MCH RBC QN AUTO: 29.2 PG (ref 27–33)
MCHC RBC AUTO-ENTMCNC: 34.4 G/DL (ref 32–36.5)
MCV RBC AUTO: 84.9 FL (ref 80–96)
MONOCYTES # BLD AUTO: 0.8 10^3/UL (ref 0–0.8)
MONOCYTES NFR BLD AUTO: 9.8 % (ref 2–8)
NEUTROPHILS # BLD AUTO: 4.6 10^3/UL (ref 1.5–8.5)
NEUTROPHILS NFR BLD AUTO: 59.5 % (ref 36–66)
PLATELET # BLD AUTO: 229 10^3/UL (ref 150–450)
POTASSIUM SERPL-SCNC: 3.8 MMOL/L (ref 3.5–5.1)
RBC # BLD AUTO: 5.62 10^6/UL (ref 4.3–6.1)
SODIUM SERPL-SCNC: 137 MMOL/L (ref 136–145)
WBC # BLD AUTO: 7.8 10^3/UL (ref 4–10)

## 2024-02-23 RX ADMIN — SODIUM CHLORIDE ONE MLS/HR: 9 INJECTION, SOLUTION INTRAVENOUS at 02:01

## 2024-05-15 ENCOUNTER — HOSPITAL ENCOUNTER (OUTPATIENT)
Dept: HOSPITAL 53 - M SLEEP | Age: 59
End: 2024-05-15
Attending: PHYSICIAN ASSISTANT
Payer: COMMERCIAL

## 2024-05-15 DIAGNOSIS — G47.33: Primary | ICD-10-CM

## 2024-09-29 ENCOUNTER — HOSPITAL ENCOUNTER (OUTPATIENT)
Dept: HOSPITAL 53 - M SLEEP | Age: 59
End: 2024-09-29
Attending: PHYSICIAN ASSISTANT
Payer: COMMERCIAL

## 2024-09-29 DIAGNOSIS — G47.33: Primary | ICD-10-CM
